# Patient Record
Sex: MALE | Race: WHITE | NOT HISPANIC OR LATINO | URBAN - METROPOLITAN AREA
[De-identification: names, ages, dates, MRNs, and addresses within clinical notes are randomized per-mention and may not be internally consistent; named-entity substitution may affect disease eponyms.]

---

## 2017-06-16 ENCOUNTER — HISTORICAL (OUTPATIENT)
Dept: ADMINISTRATIVE | Facility: HOSPITAL | Age: 67
End: 2017-06-16

## 2017-06-18 LAB — FINAL CULTURE: NORMAL

## 2017-12-18 LAB — FINAL CULTURE: NORMAL

## 2018-04-28 ENCOUNTER — HISTORICAL (OUTPATIENT)
Dept: ADMINISTRATIVE | Facility: HOSPITAL | Age: 68
End: 2018-04-28

## 2018-04-30 LAB — FINAL CULTURE: NORMAL

## 2018-09-29 ENCOUNTER — HISTORICAL (OUTPATIENT)
Dept: ADMINISTRATIVE | Facility: HOSPITAL | Age: 68
End: 2018-09-29

## 2018-10-01 LAB — FINAL CULTURE: NORMAL

## 2019-04-15 ENCOUNTER — HISTORICAL (OUTPATIENT)
Dept: ADMINISTRATIVE | Facility: HOSPITAL | Age: 69
End: 2019-04-15

## 2019-04-17 LAB — FINAL CULTURE: NORMAL

## 2019-08-27 ENCOUNTER — HISTORICAL (OUTPATIENT)
Dept: ADMINISTRATIVE | Facility: HOSPITAL | Age: 69
End: 2019-08-27

## 2019-08-29 LAB — FINAL CULTURE: NORMAL

## 2019-11-20 ENCOUNTER — HISTORICAL (OUTPATIENT)
Dept: ADMINISTRATIVE | Facility: HOSPITAL | Age: 69
End: 2019-11-20

## 2019-11-22 LAB — FINAL CULTURE: NORMAL

## 2020-01-23 ENCOUNTER — HISTORICAL (OUTPATIENT)
Dept: ADMINISTRATIVE | Facility: HOSPITAL | Age: 70
End: 2020-01-23

## 2020-01-25 LAB — FINAL CULTURE: NORMAL

## 2020-02-03 ENCOUNTER — HISTORICAL (OUTPATIENT)
Dept: ADMINISTRATIVE | Facility: HOSPITAL | Age: 70
End: 2020-02-03

## 2020-02-09 LAB
FINAL CULTURE: NORMAL
FINAL CULTURE: NORMAL

## 2020-08-05 ENCOUNTER — HISTORICAL (OUTPATIENT)
Dept: ADMINISTRATIVE | Facility: HOSPITAL | Age: 70
End: 2020-08-05

## 2020-08-07 LAB — FINAL CULTURE: NORMAL

## 2020-12-28 ENCOUNTER — HISTORICAL (OUTPATIENT)
Dept: ADMINISTRATIVE | Facility: HOSPITAL | Age: 70
End: 2020-12-28

## 2020-12-29 LAB — FINAL CULTURE: NORMAL

## 2021-04-22 ENCOUNTER — HISTORICAL (OUTPATIENT)
Dept: ADMINISTRATIVE | Facility: HOSPITAL | Age: 71
End: 2021-04-22

## 2021-04-24 LAB — FINAL CULTURE: NORMAL

## 2021-07-13 ENCOUNTER — HISTORICAL (OUTPATIENT)
Dept: ADMINISTRATIVE | Facility: HOSPITAL | Age: 71
End: 2021-07-13

## 2021-07-16 LAB — FINAL CULTURE: NORMAL

## 2021-08-11 LAB — FINAL CULTURE: NORMAL

## 2021-08-31 ENCOUNTER — HISTORICAL (OUTPATIENT)
Dept: ADMINISTRATIVE | Facility: HOSPITAL | Age: 71
End: 2021-08-31

## 2021-09-02 LAB — FINAL CULTURE: NORMAL

## 2021-11-24 ENCOUNTER — HISTORICAL (OUTPATIENT)
Dept: ADMINISTRATIVE | Facility: HOSPITAL | Age: 71
End: 2021-11-24

## 2021-11-26 LAB — FINAL CULTURE: NORMAL

## 2022-03-15 ENCOUNTER — HISTORICAL (OUTPATIENT)
Dept: ADMINISTRATIVE | Facility: HOSPITAL | Age: 72
End: 2022-03-15

## 2022-03-15 LAB
ABS NEUT (OLG): 3.61 (ref 2.1–9.2)
ALBUMIN SERPL-MCNC: 4 G/DL (ref 3.4–4.8)
ALBUMIN/GLOB SERPL: 1.2 {RATIO} (ref 1.1–2)
ALP SERPL-CCNC: 186 U/L (ref 40–150)
ALT SERPL-CCNC: 27 U/L (ref 0–55)
APPEARANCE, UA: NORMAL
AST SERPL-CCNC: 34 U/L (ref 5–34)
BASOPHILS # BLD AUTO: 0.04 10*3/UL (ref 0–0.2)
BASOPHILS NFR BLD AUTO: 0.7 % (ref 0–0.9)
BILIRUB SERPL-MCNC: 0.7 MG/DL (ref 0.2–1.2)
BILIRUB UR QL STRIP.AUTO: NEGATIVE
BILIRUB UR QL STRIP: NEGATIVE
BILIRUBIN DIRECT+TOT PNL SERPL-MCNC: 0.2 (ref 0–0.8)
BILIRUBIN DIRECT+TOT PNL SERPL-MCNC: 0.5 (ref 0–0.5)
BUN SERPL-MCNC: 17.3 MG/DL (ref 8.4–25.7)
CALCIUM SERPL-MCNC: 9.8 MG/DL (ref 8.8–10)
CHLORIDE SERPL-SCNC: 107 MMOL/L (ref 98–107)
CO2 SERPL-SCNC: 25 MMOL/L (ref 23–31)
COLOR UR: NORMAL
CREAT SERPL-MCNC: 0.81 MG/DL (ref 0.72–1.25)
CREAT UR-MCNC: 127.7 MG/DL (ref 58–161)
DO MICRO?: YES
EOSINOPHIL # BLD AUTO: 0.14 10*3/UL (ref 0–0.9)
EOSINOPHIL NFR BLD AUTO: 2.5 % (ref 0–6.5)
ERYTHROCYTE [DISTWIDTH] IN BLOOD BY AUTOMATED COUNT: 13.6 % (ref 11.5–17)
EST. AVERAGE GLUCOSE BLD GHB EST-MCNC: 142.7 MG/DL
GLOBULIN SER-MCNC: 3.2 G/DL (ref 2.4–3.5)
GLUCOSE (UA): NEGATIVE
GLUCOSE SERPL-MCNC: 128 MG/DL (ref 82–115)
GLUCOSE UR QL STRIP.AUTO: NEGATIVE
HBA1C MFR BLD: 6.6 %
HCT VFR BLD AUTO: 43.5 % (ref 42–52)
HEMOLYSIS INTERF INDEX SERPL-ACNC: -1
HEMOLYSIS INTERF INDEX SERPL-ACNC: -1
HGB BLD-MCNC: 13.6 G/DL (ref 14–18)
HGB UR QL STRIP: NORMAL
ICTERIC INTERF INDEX SERPL-ACNC: 0
IMM GRANULOCYTES # BLD AUTO: 0.02 10*3/UL (ref 0–0.02)
IMM GRANULOCYTES NFR BLD AUTO: 0.4 % (ref 0–0.43)
KETONES UR QL STRIP.AUTO: 15
KETONES UR QL STRIP: NORMAL
LEUKOCYTE ESTERASE UR QL STRIP.AUTO: NEGATIVE
LEUKOCYTE ESTERASE UR QL STRIP: NEGATIVE
LIPEMIC INTERF INDEX SERPL-ACNC: 1
LYMPHOCYTES # BLD AUTO: 1.19 10*3/UL (ref 0.6–4.6)
LYMPHOCYTES NFR BLD AUTO: 21.5 % (ref 16.2–38.3)
MAGNESIUM SERPL-MCNC: 1.7 MG/DL (ref 1.6–2.6)
MANUAL DIFF? (OHS): NO
MCH RBC QN AUTO: 25.4 PG (ref 27–31)
MCHC RBC AUTO-ENTMCNC: 31.3 G/DL (ref 33–36)
MCV RBC AUTO: 81.2 FL (ref 80–94)
MONOCYTES # BLD AUTO: 0.53 10*3/UL (ref 0.1–1.3)
MONOCYTES NFR BLD AUTO: 9.6 % (ref 4.7–11.3)
MRSA SCREEN BY PCR: NEGATIVE
NEUTROPHILS # BLD AUTO: 3.61 10*3/UL (ref 2.1–9.2)
NEUTROPHILS NFR BLD AUTO: 65.3 % (ref 49.1–73.4)
NITRITE UR QL STRIP: NEGATIVE
NRBC BLD AUTO-RTO: 0 % (ref 0–0.2)
PH UR STRIP: 5.5 [PH] (ref 5–7)
PHOSPHATE SERPL-MCNC: 3.8 MG/DL (ref 2.3–4.7)
PLATELET # BLD AUTO: 339 10*3/UL (ref 130–400)
PMV BLD AUTO: 10.3 FL (ref 7.4–10.4)
POTASSIUM SERPL-SCNC: 4.7 MMOL/L (ref 3.5–5.1)
PROBE CHECK (OHS): NORMAL
PROT SERPL-MCNC: 7.2 G/DL (ref 5.8–7.6)
PROT UR QL STRIP.AUTO: >=300
PROT UR QL STRIP: NORMAL
PROT UR STRIP-MCNC: 376.2 MG/DL
PROT/CREAT UR-RTO: 2946
RBC # BLD AUTO: 5.36 10*6/UL (ref 4.7–6.1)
RBC UR QL AUTO: NORMAL
SODIUM SERPL-SCNC: 146 MMOL/L (ref 136–145)
SP GR UR STRIP: >=1.03 (ref 1–1.03)
URATE SERPL-MCNC: 4.1 MG/DL (ref 3.5–7.2)
UROBILINOGEN UR STRIP-ACNC: 1
UROBILINOGEN UR STRIP-ACNC: NEGATIVE
WBC # SPEC AUTO: 5.5 10*3/UL (ref 4.5–11.5)

## 2022-04-04 ENCOUNTER — HISTORICAL (OUTPATIENT)
Dept: ADMINISTRATIVE | Facility: HOSPITAL | Age: 72
End: 2022-04-04

## 2022-04-10 ENCOUNTER — HISTORICAL (OUTPATIENT)
Dept: ADMINISTRATIVE | Facility: HOSPITAL | Age: 72
End: 2022-04-10
Payer: MEDICARE

## 2022-04-30 VITALS
BODY MASS INDEX: 31.1 KG/M2 | DIASTOLIC BLOOD PRESSURE: 66 MMHG | WEIGHT: 210 LBS | HEIGHT: 69 IN | SYSTOLIC BLOOD PRESSURE: 134 MMHG

## 2022-06-07 ENCOUNTER — HOSPITAL ENCOUNTER (OUTPATIENT)
Dept: RADIOLOGY | Facility: CLINIC | Age: 72
Discharge: HOME | End: 2022-06-07
Attending: ORTHOPAEDIC SURGERY
Payer: MEDICARE

## 2022-06-07 ENCOUNTER — OFFICE VISIT (OUTPATIENT)
Dept: ORTHOPEDICS | Facility: CLINIC | Age: 72
End: 2022-06-07
Payer: MEDICARE

## 2022-06-07 VITALS
HEIGHT: 68 IN | WEIGHT: 209 LBS | BODY MASS INDEX: 31.67 KG/M2 | SYSTOLIC BLOOD PRESSURE: 128 MMHG | HEART RATE: 65 BPM | DIASTOLIC BLOOD PRESSURE: 65 MMHG

## 2022-06-07 DIAGNOSIS — Z96.652 AFTERCARE FOLLOWING LEFT KNEE JOINT REPLACEMENT SURGERY: ICD-10-CM

## 2022-06-07 DIAGNOSIS — Z96.652 AFTERCARE FOLLOWING LEFT KNEE JOINT REPLACEMENT SURGERY: Primary | ICD-10-CM

## 2022-06-07 DIAGNOSIS — Z47.1 AFTERCARE FOLLOWING LEFT KNEE JOINT REPLACEMENT SURGERY: Primary | ICD-10-CM

## 2022-06-07 DIAGNOSIS — Z47.1 AFTERCARE FOLLOWING LEFT KNEE JOINT REPLACEMENT SURGERY: ICD-10-CM

## 2022-06-07 PROCEDURE — 99024 PR POST-OP FOLLOW-UP VISIT: ICD-10-PCS | Mod: POP,,, | Performed by: NURSE PRACTITIONER

## 2022-06-07 PROCEDURE — 73562 XR KNEE 3 VIEW LEFT: ICD-10-PCS | Mod: LT,,, | Performed by: ORTHOPAEDIC SURGERY

## 2022-06-07 PROCEDURE — 99024 POSTOP FOLLOW-UP VISIT: CPT | Mod: POP,,, | Performed by: NURSE PRACTITIONER

## 2022-06-07 PROCEDURE — 73562 X-RAY EXAM OF KNEE 3: CPT | Mod: LT,,, | Performed by: ORTHOPAEDIC SURGERY

## 2022-06-07 RX ORDER — LANOLIN ALCOHOL/MO/W.PET/CERES
100 CREAM (GRAM) TOPICAL
COMMUNITY
Start: 2022-04-08

## 2022-06-07 RX ORDER — SIROLIMUS 1 MG/1
TABLET, SUGAR COATED ORAL
COMMUNITY
Start: 2022-03-01

## 2022-06-07 RX ORDER — LANSOPRAZOLE 30 MG/1
1 CAPSULE, DELAYED RELEASE ORAL DAILY
COMMUNITY
Start: 2021-12-25

## 2022-06-07 RX ORDER — GEMFIBROZIL 600 MG/1
TABLET, FILM COATED ORAL
COMMUNITY
Start: 2021-12-25

## 2022-06-07 RX ORDER — CYCLOSPORINE 25 MG/1
CAPSULE, LIQUID FILLED ORAL
COMMUNITY
Start: 2021-12-25

## 2022-06-07 RX ORDER — ATORVASTATIN CALCIUM 10 MG/1
TABLET, FILM COATED ORAL
COMMUNITY

## 2022-06-07 RX ORDER — GLIMEPIRIDE 1 MG/1
TABLET ORAL
COMMUNITY
Start: 2021-12-25

## 2022-06-07 RX ORDER — PRAVASTATIN SODIUM 40 MG/1
TABLET ORAL
COMMUNITY
Start: 2021-12-25

## 2022-06-07 RX ORDER — LANOLIN ALCOHOL/MO/W.PET/CERES
400 CREAM (GRAM) TOPICAL
COMMUNITY
Start: 2022-03-28

## 2022-06-07 RX ORDER — PANTOPRAZOLE SODIUM 40 MG/1
40 TABLET, DELAYED RELEASE ORAL
COMMUNITY
Start: 2022-04-08

## 2022-06-07 RX ORDER — AMLODIPINE BESYLATE 5 MG/1
1 TABLET ORAL DAILY
COMMUNITY
Start: 2021-12-25

## 2022-06-07 RX ORDER — GABAPENTIN 300 MG/1
CAPSULE ORAL
COMMUNITY
Start: 2021-06-16

## 2022-06-07 RX ORDER — FOLIC ACID 1 MG/1
1 TABLET ORAL
COMMUNITY
Start: 2022-04-08

## 2022-06-07 RX ORDER — FUROSEMIDE 40 MG/1
40 TABLET ORAL
COMMUNITY
Start: 2021-12-25

## 2022-06-07 NOTE — PROGRESS NOTES
Past Medical History:   Diagnosis Date    Diabetes mellitus type I     Hypertension        Past Surgical History:   Procedure Laterality Date    HERNIA REPAIR      KIDNEY TRANSPLANT      KNEE ARTHROSCOPY      TONSILLECTOMY         Current Outpatient Medications   Medication Sig    amLODIPine (NORVASC) 5 MG tablet Take 1 tablet by mouth once daily.    apixaban (ELIQUIS) 5 mg Tab     atorvastatin (LIPITOR) 10 MG tablet 1 tablet    cycloSPORINE modified, NEORAL, (NEORAL) 25 MG capsule as directed    folic acid (FOLVITE) 1 MG tablet Take 1 mg by mouth.    furosemide (LASIX) 40 MG tablet Take 40 mg by mouth.    gabapentin (NEURONTIN) 300 MG capsule 1 capsule    gemfibroziL (LOPID) 600 MG tablet TAKE 1 TABLET TWICE A DAY  30 MINUTES BEFORE MORNING  AND EVENING MEALS    glimepiride (AMARYL) 1 MG tablet TAKE 1 TABLET ONCE DAILY   WITH BREAKFAST OR THE FIRSTMAIN MEAL OF THE DAY    lansoprazole (PREVACID) 30 MG capsule Take 1 capsule by mouth once daily.    magnesium oxide (MAG-OX) 400 mg (241.3 mg magnesium) tablet Take 400 mg by mouth.    pantoprazole (PROTONIX) 40 MG tablet Take 40 mg by mouth.    pravastatin (PRAVACHOL) 40 MG tablet     RAPAMUNE 1 mg Tab Take by mouth.    thiamine 100 MG tablet Take 100 mg by mouth.     No current facility-administered medications for this visit.       Review of patient's allergies indicates:  No Known Allergies    History reviewed. No pertinent family history.    Social History     Socioeconomic History    Marital status: Unknown   Tobacco Use    Smoking status: Never Smoker    Smokeless tobacco: Never Used       Chief Complaint:   Chief Complaint   Patient presents with    Follow-up     L total knee, global 4/4/22-7/3/22, pt states of pain, sharp pain and spasms,        History of present illness: Jose Enrique Nicole is a 71 y.o. male, presents to clinic today around 6 weeks status post left total knee arthroplasty.  Overall the patient is doing very well.   Currently is in physical therapy for strengthening, range of motion, mobility.  States that he is exceeding expectations at therapy at this time.  Ambulating without any assistance.  No consistent pain and discomfort.  On complain of some nerve pain in the anterior aspect of the knee.  This is not constant.  Taking Tylenol for pain relief.      Review of Systems:    Denies fevers, chills, chest pain, shortness of breath. Comprehensive review of systems performed and otherwise negative except as noted in HPI      Physical Examination:    General: awake and alert, no acute distress, healthy appearing  Head and Neck: Head atraumatic/normocephalic. Moist MM  CV: brisk cap refill  Lungs: non-labored breathing, w/o cough or SOB  Skin: no rashes present, warm to touch  Neuro: sensation grossly intact distall     Vital Signs:    Vitals:    06/07/22 1440   BP: 128/65   Pulse: 65       Body mass index is 31.78 kg/m².    Examination left knee:    Incision clean dry and intact. No erythema or drainage or signs of infection.  Sensation intact distally to left foot  Positive FHL/EHL/gastrocsoleus/tib ant  Brisk capillary refill to left foot  No swelling or signs of DVT  Range of motion: extension at 0 and flexion at 110  Stable to varus valgus  Stable to anterior and posterior drawer    X-rays:  Three views of left knee reviewed. Patient's implants appear well fixed. No signs of loosening or subsidence noted.     Assessment::post op s/p L TKA    Plan:  Patient presents today 6 weeks status post left total knee arthroplasty.  Overall his knee is doing well.  It is stable on examination x-rays today in clinic.  He is to continue physical therapy for strengthening, range of motion, mobility.  Patient also would like to return to work I will give him a release today in clinic.  Follow-up in 2 months for repeat evaluation. All questions and concerns were addressed. The patient understands and agrees with the plan of care.    The above  findings, diagnostics, and treatment plan were discussed with Dr. Harris Fallon who is in agreement with the plan of care.      This note was created using Goshi voice recognition software that occasionally misinterpreted phrases or words.    Consult note is delivered via Epic messaging service.

## 2022-06-07 NOTE — LETTER
[facility]  [address] [place] [address] [address]  [place], [address]  Phone: [phone]  Fax: [phone]    Name:Jose Enrique Nicole  :1950   Date:2022       PATIENT IS ABLE TO RETURN TO WORK AS OF:  2022      [XXX] REGULAR DUTY: [XXX] No Restrictionsbelow0:    COMMENTS    DERRELL LANDIN MD

## 2022-08-09 ENCOUNTER — OFFICE VISIT (OUTPATIENT)
Dept: ORTHOPEDICS | Facility: CLINIC | Age: 72
End: 2022-08-09
Payer: MEDICARE

## 2022-08-09 VITALS — HEIGHT: 68 IN | WEIGHT: 209 LBS | BODY MASS INDEX: 31.67 KG/M2

## 2022-08-09 DIAGNOSIS — Z47.1 AFTERCARE FOLLOWING LEFT KNEE JOINT REPLACEMENT SURGERY: Primary | ICD-10-CM

## 2022-08-09 DIAGNOSIS — Z96.652 AFTERCARE FOLLOWING LEFT KNEE JOINT REPLACEMENT SURGERY: Primary | ICD-10-CM

## 2022-08-09 PROCEDURE — 99213 PR OFFICE/OUTPT VISIT, EST, LEVL III, 20-29 MIN: ICD-10-PCS | Mod: ,,, | Performed by: NURSE PRACTITIONER

## 2022-08-09 PROCEDURE — 99213 OFFICE O/P EST LOW 20 MIN: CPT | Mod: ,,, | Performed by: NURSE PRACTITIONER

## 2022-08-09 NOTE — PROGRESS NOTES
Past Medical History:   Diagnosis Date    Diabetes mellitus type I     Hypertension        Past Surgical History:   Procedure Laterality Date    HERNIA REPAIR      KIDNEY TRANSPLANT      KNEE ARTHROSCOPY      TONSILLECTOMY         Current Outpatient Medications   Medication Sig    amLODIPine (NORVASC) 5 MG tablet Take 1 tablet by mouth once daily.    apixaban (ELIQUIS) 5 mg Tab     atorvastatin (LIPITOR) 10 MG tablet 1 tablet    cycloSPORINE modified, NEORAL, (NEORAL) 25 MG capsule as directed    folic acid (FOLVITE) 1 MG tablet Take 1 mg by mouth.    furosemide (LASIX) 40 MG tablet Take 40 mg by mouth.    gabapentin (NEURONTIN) 300 MG capsule 1 capsule    gemfibroziL (LOPID) 600 MG tablet TAKE 1 TABLET TWICE A DAY  30 MINUTES BEFORE MORNING  AND EVENING MEALS    glimepiride (AMARYL) 1 MG tablet TAKE 1 TABLET ONCE DAILY   WITH BREAKFAST OR THE FIRSTMAIN MEAL OF THE DAY    lansoprazole (PREVACID) 30 MG capsule Take 1 capsule by mouth once daily.    magnesium oxide (MAG-OX) 400 mg (241.3 mg magnesium) tablet Take 400 mg by mouth.    pantoprazole (PROTONIX) 40 MG tablet Take 40 mg by mouth.    pravastatin (PRAVACHOL) 40 MG tablet     RAPAMUNE 1 mg Tab Take by mouth.    thiamine 100 MG tablet Take 100 mg by mouth.     No current facility-administered medications for this visit.       Review of patient's allergies indicates:  No Known Allergies    History reviewed. No pertinent family history.    Social History     Socioeconomic History    Marital status: Unknown   Tobacco Use    Smoking status: Never Smoker    Smokeless tobacco: Never Used       Chief Complaint:   Chief Complaint   Patient presents with    Follow-up     2 [facility] F/U Left TKA on 4/4/22-7/3/22, pt states everything has been going good, pt states he does have some clicking or grinding when walking, pt has some pain but berable        History of present illness: Jose Enrique Nicole is a 72 y.o. male, presents to clinic today 3-1/2 months  status post left total knee arthroplasty.  Overall the patient is doing very well.  Ambulating without any assistance.  Denies any significant pain or discomfort to the knee.  Occasionally having to take Tylenol at night.  Has completed physical therapy.  Happy with his progress thus far.      Review of Systems:    Denies fevers, chills, chest pain, shortness of breath. Comprehensive review of systems performed and otherwise negative except as noted in HPI      Physical Examination:    General: awake and alert, no acute distress, healthy appearing  Head and Neck: Head atraumatic/normocephalic. Moist MM  CV: brisk cap refill  Lungs: non-labored breathing, w/o cough or SOB  Skin: no rashes present, warm to touch  Neuro: sensation grossly intact distall     Vital Signs:    There were no vitals filed for this visit.    Body mass index is 31.78 kg/m².    Examination left knee:    Incision clean dry and intact. No erythema or drainage or signs of infection.  Sensation intact distally to left foot  Positive FHL/EHL/gastrocsoleus/tib ant  Brisk capillary refill to left foot  No swelling or signs of DVT  Range of motion: extension at 0 and flexion at 115  Stable to varus valgus  Stable to anterior and posterior drawer     Assessment::post op s/p L TKA    Plan:  Patient presents to clinic today 3 months status post left total knee arthroplasty.  Overall the patient is doing well.  His knee is stable on examination.  Did explain that it takes a full year before knees completely healed.  He states understanding. We did discuss the current recommendations regarding antibiotic prophylaxis prior to any dental work or colonoscopies. Patient is aware of this and will contact her office should they need any prescriptions for antibiotics called in. Follow-up in 9 months for repeat x-rays and evaluation. All questions and concerns were addressed. The patient understands and agrees with the plan of care.      This note was created using  [employer] voice recognition software that occasionally misinterpreted phrases or words.    Consult note is delivered via Epic messaging service.

## 2023-05-16 ENCOUNTER — HOSPITAL ENCOUNTER (OUTPATIENT)
Dept: RADIOLOGY | Facility: CLINIC | Age: 73
Discharge: HOME | End: 2023-05-16
Attending: ORTHOPAEDIC SURGERY
Payer: MEDICARE

## 2023-05-16 ENCOUNTER — OFFICE VISIT (OUTPATIENT)
Dept: ORTHOPEDICS | Facility: CLINIC | Age: 73
End: 2023-05-16
Payer: MEDICARE

## 2023-05-16 VITALS
BODY MASS INDEX: 31.67 KG/M2 | DIASTOLIC BLOOD PRESSURE: 76 MMHG | HEART RATE: 81 BPM | HEIGHT: 68 IN | WEIGHT: 209 LBS | SYSTOLIC BLOOD PRESSURE: 114 MMHG

## 2023-05-16 DIAGNOSIS — Z96.652 AFTERCARE FOLLOWING LEFT KNEE JOINT REPLACEMENT SURGERY: Primary | ICD-10-CM

## 2023-05-16 DIAGNOSIS — Z47.1 AFTERCARE FOLLOWING LEFT KNEE JOINT REPLACEMENT SURGERY: ICD-10-CM

## 2023-05-16 DIAGNOSIS — Z47.1 AFTERCARE FOLLOWING LEFT KNEE JOINT REPLACEMENT SURGERY: Primary | ICD-10-CM

## 2023-05-16 DIAGNOSIS — Z96.652 AFTERCARE FOLLOWING LEFT KNEE JOINT REPLACEMENT SURGERY: ICD-10-CM

## 2023-05-16 PROCEDURE — 99213 OFFICE O/P EST LOW 20 MIN: CPT | Mod: ,,, | Performed by: ORTHOPAEDIC SURGERY

## 2023-05-16 PROCEDURE — 73564 X-RAY EXAM KNEE 4 OR MORE: CPT | Mod: LT,,, | Performed by: ORTHOPAEDIC SURGERY

## 2023-05-16 PROCEDURE — 99213 PR OFFICE/OUTPT VISIT, EST, LEVL III, 20-29 MIN: ICD-10-PCS | Mod: ,,, | Performed by: ORTHOPAEDIC SURGERY

## 2023-05-16 PROCEDURE — 73564 XR KNEE COMP 4 OR MORE VIEWS LEFT: ICD-10-PCS | Mod: LT,,, | Performed by: ORTHOPAEDIC SURGERY

## 2023-05-16 NOTE — PROGRESS NOTES
Chief Complaint:   Chief Complaint   Patient presents with    Left Knee - Follow-up     F/U for left TKA 4/4/22. Knee is doing great. Pt states he's not having any problems.        History of present illness:  72-year-old male presents today follow-up after total knee arthroplasty.  Patient has a left knee done a year ago.  Overall he is doing well.  Has no complaints of pain or other issues.    Past Medical History:   Diagnosis Date    Diabetes mellitus type I     Hypertension        Past Surgical History:   Procedure Laterality Date    HERNIA REPAIR      KIDNEY TRANSPLANT      KNEE ARTHROSCOPY      TONSILLECTOMY         Current Outpatient Medications   Medication Sig    amLODIPine (NORVASC) 5 MG tablet Take 1 tablet by mouth once daily.    apixaban (ELIQUIS) 5 mg Tab     atorvastatin (LIPITOR) 10 MG tablet 1 tablet    cycloSPORINE modified, NEORAL, (NEORAL) 25 MG capsule as directed    folic acid (FOLVITE) 1 MG tablet Take 1 mg by mouth.    furosemide (LASIX) 40 MG tablet Take 40 mg by mouth.    gabapentin (NEURONTIN) 300 MG capsule 1 capsule    gemfibroziL (LOPID) 600 MG tablet TAKE 1 TABLET TWICE A DAY  30 MINUTES BEFORE MORNING  AND EVENING MEALS    glimepiride (AMARYL) 1 MG tablet TAKE 1 TABLET ONCE DAILY   WITH BREAKFAST OR THE FIRSTMAIN MEAL OF THE DAY    lansoprazole (PREVACID) 30 MG capsule Take 1 capsule by mouth once daily.    magnesium oxide (MAG-OX) 400 mg (241.3 mg magnesium) tablet Take 400 mg by mouth.    pantoprazole (PROTONIX) 40 MG tablet Take 40 mg by mouth.    pravastatin (PRAVACHOL) 40 MG tablet     RAPAMUNE 1 mg Tab Take by mouth.    thiamine 100 MG tablet Take 100 mg by mouth.     No current facility-administered medications for this visit.       Review of patient's allergies indicates:  No Known Allergies    History reviewed. No pertinent family history.    Social History     Socioeconomic History    Marital status: Unknown   Tobacco Use    Smoking status: Never    Smokeless tobacco:  Never           Review of Systems:    Constitution: Negative for chills, fever, and sweats.  Negative for unexplained weight loss.    HENT:  Negative for headaches and blurry vision.    Cardiovascular:Negative for chest pain or irregular heart beat. Negative for hypertension.    Respiratory:  Negative for cough and shortness of breath.    Gastrointestinal: Negative for abdominal pain, heartburn, melena, nausea, and vomitting.    Genitourinary:  Negative bladder incontinence and dysuria.    Musculoskeletal:  See HPI    Neurological: Negative for numbness.    Psychiatric/Behavioral: Negative for depression.  The patient is not nervous/anxious.      Endocrine: Negative for polyuria    Hematologic/Lymphatic: Negative for bleeding problem.  Does not bruise/bleed easily.    Skin: Negative for poor would healing and rash      Physical Examination:    Vital Signs:    Vitals:    05/16/23 1252   BP: 114/76   Pulse: 81       Body mass index is 31.78 kg/m².    General: No acute distress, alert and oriented, healthy appearing    HEENT: Head is atraumatic, mucous membranes are moist    Neck: Supples, no JVD    Cardiovascular: Palpable dorsalis pedis and posterior tibial pulses, regular rate and rhythm to those pulses    Lungs: Breathing non-labored    Skin: no rashes appreciated    Neurologic: Can flex and extend knees, ankles, and toes. Sensation is grossly intact    Left knee:  Full extension.  Flexion 120.  Stable to varus.  Stable anterior-posterior drawer.  No instability detected  X-rays:  Four views of the left knee reviewed.  Patient's implants appear well fixed.  No signs of loosening or subsidence noted     Assessment::  Status post left total knee arthroplasty    Plan:  Overall patient is doing quite well.  Pain well-controlled.  He is happy with recovery.  We will plan to see him back on an as-needed basis.    This note was created using [employer] voice recognition software that occasionally misinterpreted phrases or  words.    Consult note is delivered via Epic messaging service.